# Patient Record
Sex: FEMALE | Race: WHITE | NOT HISPANIC OR LATINO | Employment: UNEMPLOYED | URBAN - METROPOLITAN AREA
[De-identification: names, ages, dates, MRNs, and addresses within clinical notes are randomized per-mention and may not be internally consistent; named-entity substitution may affect disease eponyms.]

---

## 2017-07-19 ENCOUNTER — LAB VISIT (OUTPATIENT)
Dept: LAB | Facility: HOSPITAL | Age: 47
End: 2017-07-19
Attending: OBSTETRICS & GYNECOLOGY
Payer: COMMERCIAL

## 2017-07-19 ENCOUNTER — OFFICE VISIT (OUTPATIENT)
Dept: OBSTETRICS AND GYNECOLOGY | Facility: CLINIC | Age: 47
End: 2017-07-19
Payer: COMMERCIAL

## 2017-07-19 VITALS
SYSTOLIC BLOOD PRESSURE: 118 MMHG | WEIGHT: 200.19 LBS | DIASTOLIC BLOOD PRESSURE: 72 MMHG | BODY MASS INDEX: 34.18 KG/M2 | HEIGHT: 64 IN

## 2017-07-19 DIAGNOSIS — N95.1 MENOPAUSAL SYMPTOMS: ICD-10-CM

## 2017-07-19 DIAGNOSIS — R10.2 PELVIC PAIN IN FEMALE: Primary | ICD-10-CM

## 2017-07-19 DIAGNOSIS — R10.2 PELVIC PAIN IN FEMALE: ICD-10-CM

## 2017-07-19 LAB
BASOPHILS # BLD AUTO: 0.11 K/UL
BASOPHILS NFR BLD: 1.7 %
DIFFERENTIAL METHOD: NORMAL
EOSINOPHIL # BLD AUTO: 0.2 K/UL
EOSINOPHIL NFR BLD: 2.6 %
ERYTHROCYTE [DISTWIDTH] IN BLOOD BY AUTOMATED COUNT: 13.4 %
FSH SERPL-ACNC: 58.2 MIU/ML
HCT VFR BLD AUTO: 41.8 %
HGB BLD-MCNC: 14 G/DL
LYMPHOCYTES # BLD AUTO: 1.6 K/UL
LYMPHOCYTES NFR BLD: 25.5 %
MCH RBC QN AUTO: 28.7 PG
MCHC RBC AUTO-ENTMCNC: 33.5 %
MCV RBC AUTO: 86 FL
MONOCYTES # BLD AUTO: 0.5 K/UL
MONOCYTES NFR BLD: 7.8 %
NEUTROPHILS # BLD AUTO: 4 K/UL
NEUTROPHILS NFR BLD: 61.9 %
PLATELET # BLD AUTO: 274 K/UL
PMV BLD AUTO: 10.1 FL
RBC # BLD AUTO: 4.88 M/UL
WBC # BLD AUTO: 6.43 K/UL

## 2017-07-19 PROCEDURE — 99204 OFFICE O/P NEW MOD 45 MIN: CPT | Mod: S$GLB,,, | Performed by: OBSTETRICS & GYNECOLOGY

## 2017-07-19 PROCEDURE — 83001 ASSAY OF GONADOTROPIN (FSH): CPT | Mod: PO

## 2017-07-19 PROCEDURE — 36415 COLL VENOUS BLD VENIPUNCTURE: CPT | Mod: PO

## 2017-07-19 PROCEDURE — 99999 PR PBB SHADOW E&M-NEW PATIENT-LVL II: CPT | Mod: PBBFAC,,, | Performed by: OBSTETRICS & GYNECOLOGY

## 2017-07-19 PROCEDURE — 85025 COMPLETE CBC W/AUTO DIFF WBC: CPT | Mod: PO

## 2017-07-19 NOTE — LETTER
July 19, 2017      Jesenia Painter MD  [address]  [address]  [facility] [place] [address]           [place] [facility] [place]  [address], [address]  [place] [address]  Phone: [phone]  Fax: [phone]          Patient: Kwesi Valentine   MR Number: [id number]   YOB: 1970   Date of Visit: 7/19/2017       Dear Dr. Jesenia Painter:    Thank you for referring Kwesi Valentine to me for evaluation. Attached you will find relevant portions of my assessment and plan of care.    If you have questions, please do not hesitate to call me. I look forward to following Kwesi Valentine along with you.    Sincerely,    Hossein García MD    Enclosure  CC:  No Recipients    If you would like to receive this communication electronically, please contact [email or web address] or [phone] to request more information on [employer] access.    For providers and/or their staff who would like to refer a patient to [facility], please contact us through our one-stop-shop provider referral line, [facility] [place], at [phone].    If you feel you have received this communication in error or would no longer like to receive these types of communications, please e-mail [email or web address]

## 2017-07-19 NOTE — PROGRESS NOTES
"CC: Pelvic Pain  HPI:  Patient is a 47 y.o.  4 para 3, female. I was consulted for evaluation of chronic pelvic pain. Onset of symptoms was gradual starting several years ago with stable course since that time. The pain occurs with intercourse. It is located in the LLQ and lasts several minutes. She describes the pain as sharp 4-5/10. Symptoms improve with none. In the past, she has undergone medical management, including none.  She has no history of PID, STD's. She does not desire further childbearing. Has dyspareunia as well    /72   Ht 5' 4" (1.626 m)   Wt 90.8 kg (200 lb 3.2 oz)   BMI 34.36 kg/m²     The physical exam is generally normal. Patient appears well, alert and oriented x 3, pleasant, cooperative. Vitals are as noted. Neck supple and free of adenopathy, or masses. No thyromegaly. FATIMAH. Ears, throat are normal. Lungs are clear to auscultation. Heart sounds are normal, no murmurs, clicks, gallops or rubs. Abdomen is soft, no tenderness, masses or organomegaly.  Breasts: breasts appear normal, no suspicious masses, no skin or nipple changes or axillary nodes. Self exam is encouraged. Pelvis: normal external genitalia, vulva, vagina, cervix, uterus and adnexa. Exam chaperoned by female assistant. Extremities are normal. Peripheral pulses are normal. Screening neurological exam is normal without focal findings. Skin is normal without suspicious lesions noted.    Diagnosis:  1. Pelvic pain in female    2. Menopausal symptoms        Plan:  Orders Placed This Encounter   Procedures    Follicle stimulating hormone     Standing Status:   Future     Standing Expiration Date:   2018    CBC auto differential     Standing Status:   Future     Standing Expiration Date:   2018   will obtain report for u/s and CT from [facility]  Will review and call pt with plan for f/u  "

## 2017-07-28 ENCOUNTER — TELEPHONE (OUTPATIENT)
Dept: OBSTETRICS AND GYNECOLOGY | Facility: CLINIC | Age: 47
End: 2017-07-28

## 2017-07-28 NOTE — TELEPHONE ENCOUNTER
----- Message from Maicol Montejo sent at 7/28/2017  9:47 AM CDT -----  Contact: [phone]/self  Pt would like to speak with the nurse to discuss her test results.  Please advise

## 2017-08-01 ENCOUNTER — TELEPHONE (OUTPATIENT)
Dept: OBSTETRICS AND GYNECOLOGY | Facility: CLINIC | Age: 47
End: 2017-08-01

## 2017-08-01 NOTE — TELEPHONE ENCOUNTER
----- Message from Lashell Mckeon sent at 8/1/2017  9:51 AM CDT -----  Contact: Self [phone]  TEST RESULTS:   Patient would like to get test results.  Name of test (lab, mammo, etc.): Lab, Ct-Scan and ultrasound   Date of test: 7/19/17

## 2017-08-01 NOTE — TELEPHONE ENCOUNTER
Inform pt I reviewed labs and cbc is nml and FSH confirms menopausal status  Still awaiting imaging results from [facility]  Schedule appt for f/u after results obtained

## 2017-08-07 ENCOUNTER — TELEPHONE (OUTPATIENT)
Dept: OBSTETRICS AND GYNECOLOGY | Facility: CLINIC | Age: 47
End: 2017-08-07

## 2017-08-07 NOTE — TELEPHONE ENCOUNTER
Patient is requesting x-ray and ultrasound results that were done at [facility].    Please advise.

## 2017-08-07 NOTE — TELEPHONE ENCOUNTER
----- Message from Itzel Vazquez sent at 8/7/2017 10:33 AM CDT -----  Contact: [phone]/self  Patient requesting to speak with you regarding lab results. Please advise.

## 2017-08-21 ENCOUNTER — TELEPHONE (OUTPATIENT)
Dept: OBSTETRICS AND GYNECOLOGY | Facility: CLINIC | Age: 47
End: 2017-08-21

## 2017-08-21 NOTE — TELEPHONE ENCOUNTER
----- Message from Mei Mckinney sent at 8/21/2017  9:44 AM CDT -----  Contact: self, [phone]  Patient called in requesting results from lab test done on 7/19. Please advise.

## 2017-08-21 NOTE — TELEPHONE ENCOUNTER
Still haven't received u/s from [facility]. Patient notified release of information being handled today. She would like an update sometime this week.

## 2017-09-06 ENCOUNTER — TELEPHONE (OUTPATIENT)
Dept: OBSTETRICS AND GYNECOLOGY | Facility: CLINIC | Age: 47
End: 2017-09-06

## 2017-09-13 ENCOUNTER — OFFICE VISIT (OUTPATIENT)
Dept: OBSTETRICS AND GYNECOLOGY | Facility: CLINIC | Age: 47
End: 2017-09-13
Payer: COMMERCIAL

## 2017-09-13 VITALS
WEIGHT: 201 LBS | DIASTOLIC BLOOD PRESSURE: 76 MMHG | BODY MASS INDEX: 34.31 KG/M2 | SYSTOLIC BLOOD PRESSURE: 124 MMHG | HEIGHT: 64 IN

## 2017-09-13 DIAGNOSIS — N83.202 BILATERAL OVARIAN CYSTS: Primary | ICD-10-CM

## 2017-09-13 DIAGNOSIS — R10.2 PELVIC PAIN IN FEMALE: ICD-10-CM

## 2017-09-13 DIAGNOSIS — N83.201 BILATERAL OVARIAN CYSTS: Primary | ICD-10-CM

## 2017-09-13 PROCEDURE — 99214 OFFICE O/P EST MOD 30 MIN: CPT | Mod: S$GLB,,, | Performed by: OBSTETRICS & GYNECOLOGY

## 2017-09-13 PROCEDURE — 99999 PR PBB SHADOW E&M-EST. PATIENT-LVL II: CPT | Mod: PBBFAC,,, | Performed by: OBSTETRICS & GYNECOLOGY

## 2017-09-13 PROCEDURE — 3008F BODY MASS INDEX DOCD: CPT | Mod: S$GLB,,, | Performed by: OBSTETRICS & GYNECOLOGY

## 2017-09-13 NOTE — PROGRESS NOTES
"CC:h/o Rt ov cyst with pain  Chief Complaint   Patient presents with    Follow-up       HPI:    47 y.o.   OB History      Para Term  AB Living    4 3     1      SAB TAB Ectopic Multiple Live Births    1                Complaining of:   Still has RLQ pain, 6/10, nothing alleviates it and wants ovaries out. Had a Lap BTL, Lap Vikki and TLH at [facility]    (Not in a hospital admission)    Review of patient's allergies indicates:  No Known Allergies     Past Medical History:   Diagnosis Date    Ovarian cyst      Past Surgical History:   Procedure Laterality Date    CHOLECYSTECTOMY      HYSTERECTOMY       Family History   Problem Relation Age of Onset    Diabetes Father     Breast cancer Mother     Cancer Mother      Social History   Substance Use Topics    Smoking status: Never Smoker    Smokeless tobacco: Never Used    Alcohol use No     ROS:  GENERAL: Feeling well overall. Denies fever or chills.   SKIN: Denies rash or lesions.   HEAD: Denies head injury or headache.   NODES: Denies enlarged lymph nodes.   CHEST: Denies chest pain or shortness of breath.   CARDIOVASCULAR: Denies palpitations or left sided chest pain.    ABDOMEN: Denies diarrhea, nausea, vomiting or rectal bleeding.   URINARY: No dysuria, hematuria, or burning on urination.  REPRODUCTIVE: See HPI.   BREASTS: Denies pain, lumps, or nipple discharge.   HEMATOLOGIC: No easy bruisability or excessive bleeding.   MUSCULOSKELETAL: Denies joint pain or swelling.   NEUROLOGIC: Denies syncope or weakness.   PSYCHIATRIC: Denies depression, anxiety or mood swings.      PE: /76   Ht 5' 4" (1.626 m)   Wt 91.2 kg (201 lb)   BMI 34.50 kg/m²      APPEARANCE: Well nourished, well developed, in no acute distress.  SKIN: Normal skin turgor, no lesions.  NECK: Neck symmetric without masses or thyromegaly.  NODES: No inguinal, cervical, axillary or femoral lymph node enlargement.  CARDIOVASCULAR: Normal S1, S2. No rubs, murmurs or " gallops.  NEUROLOGIC: Normal mood and affect. No depression or anxiety.   ABDOMEN: Soft. No tenderness or masses. No hepatosplenomegaly. No hernias. Obese  RESPIRATORY: Normal respiratory effort with no retractions or use of accessory muscles.    ASSESSMENT/ PLAN    Kwesi was seen today for follow-up.    Diagnoses and all orders for this visit:    Bilateral ovarian cysts  -     US Pelvis Limited Non OB; Future    Pelvic pain in female      Records [facility] and [facility]  Rpt u/s to assess ovaries and make determination if BSO may help with pain  Pt wants BSO      Hossein García MD

## 2017-09-20 ENCOUNTER — HOSPITAL ENCOUNTER (OUTPATIENT)
Dept: RADIOLOGY | Facility: HOSPITAL | Age: 47
Discharge: HOME | End: 2017-09-20
Attending: OBSTETRICS & GYNECOLOGY
Payer: COMMERCIAL

## 2017-09-20 ENCOUNTER — OFFICE VISIT (OUTPATIENT)
Dept: OBSTETRICS AND GYNECOLOGY | Facility: CLINIC | Age: 47
End: 2017-09-20
Payer: COMMERCIAL

## 2017-09-20 VITALS
BODY MASS INDEX: 33.11 KG/M2 | DIASTOLIC BLOOD PRESSURE: 68 MMHG | HEIGHT: 66 IN | SYSTOLIC BLOOD PRESSURE: 124 MMHG | WEIGHT: 206 LBS

## 2017-09-20 DIAGNOSIS — N83.202 BILATERAL OVARIAN CYSTS: ICD-10-CM

## 2017-09-20 DIAGNOSIS — N83.201 BILATERAL OVARIAN CYSTS: ICD-10-CM

## 2017-09-20 DIAGNOSIS — R19.00 PELVIC MASS IN FEMALE: Primary | ICD-10-CM

## 2017-09-20 DIAGNOSIS — R10.2 PELVIC PAIN IN FEMALE: ICD-10-CM

## 2017-09-20 PROCEDURE — 3008F BODY MASS INDEX DOCD: CPT | Mod: S$GLB,,, | Performed by: OBSTETRICS & GYNECOLOGY

## 2017-09-20 PROCEDURE — 99214 OFFICE O/P EST MOD 30 MIN: CPT | Mod: S$GLB,,, | Performed by: OBSTETRICS & GYNECOLOGY

## 2017-09-20 PROCEDURE — 99999 PR PBB SHADOW E&M-EST. PATIENT-LVL II: CPT | Mod: PBBFAC,,, | Performed by: OBSTETRICS & GYNECOLOGY

## 2017-09-20 PROCEDURE — 76856 US EXAM PELVIC COMPLETE: CPT | Mod: TC,PO

## 2017-09-20 NOTE — PROGRESS NOTES
"CC: Pelvic Pain  HPI:  Patient is a 47 y.o. , female. Patient presents for evaluation of accute pelvic pain and dyspareunia. Onset of symptoms was abrupt starting 2 months ago with gradually worsening course since that time. The pain occurs all throughout the month, with intercourse and comes and goes. It is located in the pelvic . She describes the pain as sharp, stabbing, aching and intermittent. Symptoms improve with none. In the past, she has undergone medical management, including prior ASHIA but still has both ovaries and wants them out.  She has no history of PID, STD's.    /68   Ht 5' 6" (1.676 m)   Wt 93.4 kg (206 lb)   BMI 33.25 kg/m²     The physical exam is generally normal. Patient appears well, alert and oriented x 3, pleasant, cooperative. Vitals are as noted. Neck supple and free of adenopathy, or masses. No thyromegaly. FATIMAH. Ears, throat are normal. Lungs are clear to auscultation. Heart sounds are normal, no murmurs, clicks, gallops or rubs. Abdomen is soft, no tenderness, masses or organomegaly.  Breasts: breasts appear normal, no suspicious masses, no skin or nipple changes or axillary nodes, not examined. Self exam is encouraged. Pelvis: examination not indicated. Exam chaperoned by female assistant. Extremities are normal. Peripheral pulses are normal. Screening neurological exam is normal without focal findings. Skin is normal without suspicious lesions noted.  Exam: US PELVIS COMP WITH TRANSVAG NON-OB (XPD),    Date:  09/20/17 09:11:00    History: N83.201 Unspecified ovarian cyst, right side; N83.202 Unspecified ovarian cyst, left side.  Hysterectomy.    Comparison:  No prior  studies available for comparison.    Findings:     Uterus is surgically absent.  There is a well-circumscribed hypoechoic focus measuring 3.0 x 1.1 x 2.4 cm.  This finding is located within the right para midline pelvis and is indeterminate.  This could represent the right ovary containing a large hemorrhagic " cyst or cyst with debris.  Alternatively this could represent a hematoma or possible postoperative abscess.  CT could be performed for further characterization.    The left ovary measures 3.2 x 2.0 x 2.4 cm.  Normal Doppler flow identified.  A simple appearing prominent follicle or cyst is present measuring 1.5 cm in maximal dimension.   Impression          The uterus is surgically absent.    Indeterminate 3.0 cm well-circumscribed hypoechoic focus right para midline pelvis as detailed above.    1.5 cm simple appearing prominent follicle or cyst of the left ovary.      Electronically signed by: MICHAEL ESTRELLA MD  Date: 09/20/17  Time: 10:49        Diagnosis:  1. Pelvic mass in female    2. Pelvic pain in female        Plan:  No orders of the defined types were placed in this encounter.  discussed monitoring and pain control vs surgical eval of pelvic mass  Pt wants ovaries and mass out  Will schedule Dx Laparowscopy and BSO at [facility]    Total time spent face to face with the patient: 35 minute  More than 100% of the time spent counseling/coordinating care about dx of mass, sx mgmt, conservative vs sx to dx aND TX PAIN. discussed difficult location of mass ma limit complete removal

## 2017-10-11 ENCOUNTER — TELEPHONE (OUTPATIENT)
Dept: OBSTETRICS AND GYNECOLOGY | Facility: CLINIC | Age: 47
End: 2017-10-11

## 2017-10-11 NOTE — TELEPHONE ENCOUNTER
----- Message from Tonya Mott LPN sent at 9/20/2017 12:52 PM CDT -----  Regarding: surgery   Please schedule pt for a DX laparoscopy with BSO at [facility], next available date please, thanks!

## 2017-10-11 NOTE — TELEPHONE ENCOUNTER
----- Message from Tonya Mott LPN sent at 9/20/2017 12:52 PM CDT -----  Regarding: surgery   Please schedule pt for a DX laparoscopy with BSO at [facility], next available date please, thanks!

## 2017-10-23 ENCOUNTER — OFFICE VISIT (OUTPATIENT)
Dept: OBSTETRICS AND GYNECOLOGY | Facility: CLINIC | Age: 47
End: 2017-10-23
Payer: COMMERCIAL

## 2017-10-23 VITALS
DIASTOLIC BLOOD PRESSURE: 70 MMHG | SYSTOLIC BLOOD PRESSURE: 110 MMHG | WEIGHT: 204 LBS | BODY MASS INDEX: 32.78 KG/M2 | HEIGHT: 66 IN

## 2017-10-23 DIAGNOSIS — R10.2 PELVIC PAIN IN FEMALE: Primary | ICD-10-CM

## 2017-10-23 DIAGNOSIS — R19.00 PELVIC MASS IN FEMALE: ICD-10-CM

## 2017-10-23 PROCEDURE — 99499 UNLISTED E&M SERVICE: CPT | Mod: S$GLB,,, | Performed by: OBSTETRICS & GYNECOLOGY

## 2017-10-23 PROCEDURE — 99999 PR PBB SHADOW E&M-EST. PATIENT-LVL II: CPT | Mod: PBBFAC,,, | Performed by: OBSTETRICS & GYNECOLOGY

## 2017-10-23 NOTE — PROGRESS NOTES
ADMISSION H&P  CC:bilateral ovarian cysts and pelvic pain   Chief Complaint   Patient presents with    Pre-op Exam     dx lap with bso on 10/30/2017 at [facility]       HPI:  47 y.o.  presents for pre-op H&P for bilateral ovarian cysts and pelvic pain .  No LMP recorded. Patient has had a hysterectomy..    Past Medical History:   Diagnosis Date    Ovarian cyst      Past Surgical History:   Procedure Laterality Date    CHOLECYSTECTOMY      HYSTERECTOMY       Family History   Problem Relation Age of Onset    Diabetes Father     Breast cancer Mother     Cancer Mother      Review of patient's allergies indicates:  No Known Allergies  No current outpatient prescriptions on file.  Social History     Social History    Marital status:      Spouse name: N/A    Number of children: N/A    Years of education: N/A     Occupational History    Not on file.     Social History Main Topics    Smoking status: Never Smoker    Smokeless tobacco: Never Used    Alcohol use No    Drug use: No    Sexual activity: Yes     Partners: Male      Comment:      Other Topics Concern    Not on file     Social History Narrative    No narrative on file         Last pap had hyst    Last ultrasound   Date:  17 09:11:00    History: N83.201 Unspecified ovarian cyst, right side; N83.202 Unspecified ovarian cyst, left side.  Hysterectomy.    Comparison:  No prior  studies available for comparison.    Findings:     Uterus is surgically absent.  There is a well-circumscribed hypoechoic focus measuring 3.0 x 1.1 x 2.4 cm.  This finding is located within the right para midline pelvis and is indeterminate.  This could represent the right ovary containing a large hemorrhagic cyst or cyst with debris.  Alternatively this could represent a hematoma or possible postoperative abscess.  CT could be performed for further characterization.    The left ovary measures 3.2 x 2.0 x 2.4 cm.  Normal Doppler flow identified.  A simple  appearing prominent follicle or cyst is present measuring 1.5 cm in maximal dimension.   Impression          The uterus is surgically absent.    Indeterminate 3.0 cm well-circumscribed hypoechoic focus right para midline pelvis as detailed above.    1.5 cm simple appearing prominent follicle or cyst of the left ovary.      Electronically signed by: MICHAEL ESTRELLA MD  Date: 09/20/17         Vitals:    10/23/17 0929   BP: 110/70     General Appearance: Alert, appropriate appearance for age. No acute distress, Chest/Respiratory Exam: Normal.   Cardiovascular Exam: regular rate and rhythm   Gastrointestinal Exam: soft, non-tender; bowel sounds normal; no masses,  no organomegaly  Pelvic Exam Female: Exam deferred.   Psychiatric Exam: Alert and oriented, appropriate affect.    Assessment:   Chief Complaint   Patient presents with    Pre-op Exam     dx lap with bso on 10/30/2017 at [facility]       Plan:   Chief Complaint   Patient presents with         dx lap with bso on 10/30/2017 at [facility]     I have discussed the risks, benefits, indications, and alternatives of the procedure in detail.  The patient verbalizes her understanding.  All questions answered.  Consents signed.  The patient agrees to proceed to proceed as planned.

## 2017-11-08 ENCOUNTER — OFFICE VISIT (OUTPATIENT)
Dept: OBSTETRICS AND GYNECOLOGY | Facility: CLINIC | Age: 47
End: 2017-11-08
Payer: COMMERCIAL

## 2017-11-08 VITALS
SYSTOLIC BLOOD PRESSURE: 140 MMHG | WEIGHT: 207 LBS | BODY MASS INDEX: 33.27 KG/M2 | DIASTOLIC BLOOD PRESSURE: 98 MMHG | HEIGHT: 66 IN

## 2017-11-08 DIAGNOSIS — N95.1 MENOPAUSAL SYMPTOMS: Primary | ICD-10-CM

## 2017-11-08 DIAGNOSIS — N12 PYELONEPHRITIS: ICD-10-CM

## 2017-11-08 DIAGNOSIS — K59.03 DRUG-INDUCED CONSTIPATION: ICD-10-CM

## 2017-11-08 DIAGNOSIS — Z79.890 HORMONE REPLACEMENT THERAPY (HRT): ICD-10-CM

## 2017-11-08 PROCEDURE — 99999 PR PBB SHADOW E&M-EST. PATIENT-LVL II: CPT | Mod: PBBFAC,,, | Performed by: OBSTETRICS & GYNECOLOGY

## 2017-11-08 PROCEDURE — 99214 OFFICE O/P EST MOD 30 MIN: CPT | Mod: S$GLB,,, | Performed by: OBSTETRICS & GYNECOLOGY

## 2017-11-08 RX ORDER — HYOSCYAMINE SULFATE 0.12 MG/1
TABLET SUBLINGUAL
COMMUNITY
Start: 2017-11-03

## 2017-11-08 RX ORDER — CEFUROXIME AXETIL 500 MG/1
TABLET ORAL
COMMUNITY
Start: 2017-11-03

## 2017-11-08 RX ORDER — IBUPROFEN 800 MG/1
TABLET ORAL
COMMUNITY
Start: 2017-10-30

## 2017-11-08 RX ORDER — ESTRADIOL 0.5 MG/1
0.5 TABLET ORAL DAILY
Qty: 30 TABLET | Refills: 11 | Status: SHIPPED | OUTPATIENT
Start: 2017-11-08 | End: 2018-11-26 | Stop reason: SDUPTHER

## 2017-11-08 RX ORDER — OXYCODONE AND ACETAMINOPHEN 5; 325 MG/1; MG/1
TABLET ORAL
COMMUNITY
Start: 2017-10-30

## 2017-11-08 NOTE — PROGRESS NOTES
"CC:kidney infxn, hot flashes  Chief Complaint   Patient presents with    Post-op Evaluation       HPI:    47 y.o.   OB History      Para Term  AB Living    4 3     1      SAB TAB Ectopic Multiple Live Births    1                Complaining of: s/p [facility] on [facility]30 with ureteral stents placed bf BSO. Had left flank pain starting tues got worse on Thurs and went to [facility] ER. CT scan showed left kidney infxn, started abx and is feeling better  Saw Dr. Sandoval and he ordered more scans today. C/o constipation from pain meds and hot flashes      (Not in a hospital admission)    Review of patient's allergies indicates:  No Known Allergies     Past Medical History:   Diagnosis Date    Ovarian cyst      Past Surgical History:   Procedure Laterality Date    CHOLECYSTECTOMY      HYSTERECTOMY      OOPHORECTOMY      SALPINGECTOMY       Family History   Problem Relation Age of Onset    Diabetes Father     Breast cancer Mother     Cancer Mother      Social History   Substance Use Topics    Smoking status: Never Smoker    Smokeless tobacco: Never Used    Alcohol use No     ROS:  GENERAL: Feeling well overall. Denies fever or chills.   SKIN: Denies rash or lesions.   HEAD: Denies head injury or headache.   NODES: Denies enlarged lymph nodes.   CHEST: Denies chest pain or shortness of breath.   CARDIOVASCULAR: Denies palpitations or left sided chest pain.    ABDOMEN: Denies diarrhea, nausea, vomiting or rectal bleeding.   URINARY: No dysuria, hematuria, or burning on urination.  REPRODUCTIVE: See HPI.   BREASTS: Denies pain, lumps, or nipple discharge.   HEMATOLOGIC: No easy bruisability or excessive bleeding.   MUSCULOSKELETAL: Denies joint pain or swelling.   NEUROLOGIC: Denies syncope or weakness.   PSYCHIATRIC: Denies depression, anxiety or mood swings.      PE: BP (!) 140/98   Ht 5' 6" (1.676 m)   Wt 93.9 kg (207 lb)   BMI 33.41 kg/m²      APPEARANCE: Well nourished, well developed, in no acute " distress.  SKIN: Normal skin turgor, no lesions.  NECK: Neck symmetric without masses or thyromegaly.  NODES: No inguinal, cervical, axillary or femoral lymph node enlargement.  CARDIOVASCULAR: Normal S1, S2. No rubs, murmurs or gallops.  NEUROLOGIC: Normal mood and affect. No depression or anxiety.   ABDOMEN: Soft. No tenderness or masses. No hepatosplenomegaly. No hernias.  RESPIRATORY: Normal respiratory effort with no retractions or use of accessory muscles.  No flank pain today  ASSESSMENT/ PLAN    Kwesi was seen today for post-op evaluation.    Diagnoses and all orders for this visit:    Menopausal symptoms    Hormone replacement therapy (HRT)    Pyelonephritis    Drug-induced constipation    Other orders  -     estradiol (ESTRACE) 0.5 MG tablet; Take 1 tablet (0.5 mg total) by mouth once daily.      Get otc stool softener  Records from [facility]/ on path report  Patient was counseled today on the increased risks of CVD, MI, VTE, CVA , and Invasive Breast Cancer on Prempro and the increased risk of CVA with Premarin as reported by the [employer] studies; the benefits of HRT/ERT; her personal risks which include; alternative therapies for vasomotor symptoms (not FDA approved) including soy, black cohosh, Vit E and avoidance of triggers such as cigarette smoking, alcohol, humidity, stress and caffeine; alternative Rxs for treatment of menopause symptoms such as antidepressants or Clonidine.       Hossein García MD

## 2018-11-26 ENCOUNTER — TELEPHONE (OUTPATIENT)
Dept: OBSTETRICS AND GYNECOLOGY | Facility: CLINIC | Age: 48
End: 2018-11-26

## 2018-11-26 RX ORDER — ESTRADIOL 0.5 MG/1
TABLET ORAL
Qty: 30 TABLET | Refills: 11 | Status: SHIPPED | OUTPATIENT
Start: 2018-11-26 | End: 2019-12-24

## 2018-11-26 NOTE — TELEPHONE ENCOUNTER
----- Message from Milagros Russell sent at 11/26/2018  2:57 PM CST -----  Contact: [phone]  Patient is returning a call from your office. Please advise

## 2019-12-24 RX ORDER — ESTRADIOL 0.5 MG/1
TABLET ORAL
Qty: 30 TABLET | Refills: 11 | Status: SHIPPED | OUTPATIENT
Start: 2019-12-24 | End: 2021-01-04

## 2020-07-10 NOTE — TELEPHONE ENCOUNTER
----- Message from Shanda Saenz sent at 11/26/2018  1:50 PM CST -----  Contact: [phone]/self  Pt would like a callback concerning medicationestradiol (ESTRACE) 0.5 MG tablet  sent in today. Please call and advise.    R/F Gabapentin